# Patient Record
Sex: MALE | Race: WHITE | NOT HISPANIC OR LATINO
[De-identification: names, ages, dates, MRNs, and addresses within clinical notes are randomized per-mention and may not be internally consistent; named-entity substitution may affect disease eponyms.]

---

## 2017-01-12 ENCOUNTER — NON-APPOINTMENT (OUTPATIENT)
Age: 57
End: 2017-01-12

## 2017-01-12 ENCOUNTER — APPOINTMENT (OUTPATIENT)
Dept: CARDIOLOGY | Facility: CLINIC | Age: 57
End: 2017-01-12

## 2017-01-12 VITALS
SYSTOLIC BLOOD PRESSURE: 118 MMHG | DIASTOLIC BLOOD PRESSURE: 70 MMHG | TEMPERATURE: 97.4 F | HEART RATE: 86 BPM | WEIGHT: 144 LBS | HEIGHT: 72 IN | OXYGEN SATURATION: 98 % | BODY MASS INDEX: 19.5 KG/M2

## 2017-01-12 DIAGNOSIS — R00.2 PALPITATIONS: ICD-10-CM

## 2017-01-12 DIAGNOSIS — R94.31 ABNORMAL ELECTROCARDIOGRAM [ECG] [EKG]: ICD-10-CM

## 2017-01-12 DIAGNOSIS — R19.00 INTRA-ABDOMINAL AND PELVIC SWELLING, MASS AND LUMP, UNSPECIFIED SITE: ICD-10-CM

## 2017-01-12 DIAGNOSIS — R63.4 ABNORMAL WEIGHT LOSS: ICD-10-CM

## 2017-01-12 RX ORDER — FLUTICASONE PROPIONATE 50 UG/1
50 SPRAY, METERED NASAL
Qty: 16 | Refills: 0 | Status: COMPLETED | COMMUNITY
Start: 2017-01-10

## 2017-01-12 RX ORDER — LATANOPROST/PF 0.005 %
0.01 DROPS OPHTHALMIC (EYE)
Qty: 2 | Refills: 0 | Status: COMPLETED | COMMUNITY
Start: 2016-12-10

## 2017-01-12 RX ORDER — FLUOXETINE HYDROCHLORIDE 20 MG/5ML
20 SOLUTION ORAL
Qty: 90 | Refills: 0 | Status: COMPLETED | COMMUNITY
Start: 2016-07-17

## 2017-01-12 RX ORDER — ALENDRONATE SODIUM 70 MG/75ML
70 SOLUTION ORAL
Qty: 300 | Refills: 0 | Status: COMPLETED | COMMUNITY
Start: 2016-09-27

## 2017-01-12 RX ORDER — TERBINAFINE HYDROCHLORIDE 250 MG/1
250 TABLET ORAL
Qty: 30 | Refills: 0 | Status: COMPLETED | COMMUNITY
Start: 2017-01-05

## 2017-01-17 ENCOUNTER — APPOINTMENT (OUTPATIENT)
Dept: ULTRASOUND IMAGING | Facility: HOSPITAL | Age: 57
End: 2017-01-17

## 2017-01-17 ENCOUNTER — OUTPATIENT (OUTPATIENT)
Dept: OUTPATIENT SERVICES | Facility: HOSPITAL | Age: 57
LOS: 1 days | End: 2017-01-17
Payer: COMMERCIAL

## 2017-01-17 PROCEDURE — 76700 US EXAM ABDOM COMPLETE: CPT | Mod: 26

## 2017-01-17 PROCEDURE — 76700 US EXAM ABDOM COMPLETE: CPT

## 2017-01-27 ENCOUNTER — APPOINTMENT (OUTPATIENT)
Dept: HEMATOLOGY ONCOLOGY | Facility: CLINIC | Age: 57
End: 2017-01-27

## 2017-01-27 VITALS
DIASTOLIC BLOOD PRESSURE: 66 MMHG | TEMPERATURE: 98.3 F | BODY MASS INDEX: 20.32 KG/M2 | HEIGHT: 72 IN | WEIGHT: 150 LBS | HEART RATE: 80 BPM | SYSTOLIC BLOOD PRESSURE: 110 MMHG

## 2017-01-27 DIAGNOSIS — Z98.890 OTHER SPECIFIED POSTPROCEDURAL STATES: ICD-10-CM

## 2017-01-27 DIAGNOSIS — R13.10 DYSPHAGIA, UNSPECIFIED: ICD-10-CM

## 2017-01-27 RX ORDER — LATANOPROST/PF 0.005 %
0.01 DROPS OPHTHALMIC (EYE)
Refills: 0 | Status: ACTIVE | COMMUNITY

## 2017-01-30 ENCOUNTER — LABORATORY RESULT (OUTPATIENT)
Age: 57
End: 2017-01-30

## 2017-01-31 LAB
ALBUMIN SERPL ELPH-MCNC: 4.8 G/DL
ALP BLD-CCNC: 73 U/L
ALT SERPL-CCNC: 34 U/L
ANION GAP SERPL CALC-SCNC: 14 MMOL/L
AST SERPL-CCNC: 25 U/L
BASOPHILS # BLD AUTO: 0.03 K/UL
BASOPHILS NFR BLD AUTO: 0.4 %
BILIRUB SERPL-MCNC: 0.4 MG/DL
BUN SERPL-MCNC: 20 MG/DL
CALCIUM SERPL-MCNC: 9.9 MG/DL
CHLORIDE SERPL-SCNC: 99 MMOL/L
CO2 SERPL-SCNC: 28 MMOL/L
CREAT SERPL-MCNC: 1.02 MG/DL
DEPRECATED KAPPA LC FREE/LAMBDA SER: 1.28 RATIO
DEPRECATED KAPPA LC FREE/LAMBDA SER: 1.28 RATIO
EOSINOPHIL # BLD AUTO: 0.05 K/UL
EOSINOPHIL NFR BLD AUTO: 0.6 %
FERRITIN SERPL-MCNC: 87.4 NG/ML
FOLATE SERPL-MCNC: >20 NG/ML
GLUCOSE SERPL-MCNC: 94 MG/DL
HCT VFR BLD CALC: 42.2 %
HCYS SERPL-MCNC: 7.8 UMOL/L
HGB BLD-MCNC: 14.2 G/DL
IGA SER QL IEP: 189 MG/DL
IGG SER QL IEP: 1060 MG/DL
IGM SER QL IEP: 113 MG/DL
IMM GRANULOCYTES NFR BLD AUTO: 0.1 %
IRON SATN MFR SERPL: 18 %
IRON SERPL-MCNC: 58 UG/DL
KAPPA LC CSF-MCNC: 1.38 MG/DL
KAPPA LC CSF-MCNC: 1.38 MG/DL
KAPPA LC SERPL-MCNC: 1.76 MG/DL
KAPPA LC SERPL-MCNC: 1.76 MG/DL
LDH SERPL-CCNC: 150 U/L
LYMPHOCYTES # BLD AUTO: 3.19 K/UL
LYMPHOCYTES NFR BLD AUTO: 38.9 %
MAN DIFF?: NORMAL
MCHC RBC-ENTMCNC: 31.3 PG
MCHC RBC-ENTMCNC: 33.6 GM/DL
MCV RBC AUTO: 93 FL
MONOCYTES # BLD AUTO: 0.67 K/UL
MONOCYTES NFR BLD AUTO: 8.2 %
NEUTROPHILS # BLD AUTO: 4.24 K/UL
NEUTROPHILS NFR BLD AUTO: 51.8 %
PLATELET # BLD AUTO: 165 K/UL
POTASSIUM SERPL-SCNC: 4.6 MMOL/L
PROT SERPL-MCNC: 7.3 G/DL
RBC # BLD: 4.54 M/UL
RBC # FLD: 13.3 %
SODIUM SERPL-SCNC: 141 MMOL/L
TIBC SERPL-MCNC: 328 UG/DL
UIBC SERPL-MCNC: 270 UG/DL
VIT B12 SERPL-MCNC: 948 PG/ML
WBC # FLD AUTO: 8.19 K/UL

## 2017-02-01 LAB
ALBUMIN MFR SERPL ELPH: 65.8 %
ALBUMIN SERPL-MCNC: 4.8 G/DL
ALBUMIN/GLOB SERPL: 1.9 RATIO
ALPHA1 GLOB MFR SERPL ELPH: 3.2 %
ALPHA1 GLOB SERPL ELPH-MCNC: 0.2 G/DL
ALPHA2 GLOB MFR SERPL ELPH: 7.3 %
ALPHA2 GLOB SERPL ELPH-MCNC: 0.5 G/DL
B-GLOBULIN MFR SERPL ELPH: 10.2 %
B-GLOBULIN SERPL ELPH-MCNC: 0.7 G/DL
GAMMA GLOB FLD ELPH-MCNC: 1 G/DL
GAMMA GLOB MFR SERPL ELPH: 13.5 %
INTERPRETATION SERPL IEP-IMP: NORMAL
M PROTEIN SPEC IFE-MCNC: NORMAL
PROT SERPL-MCNC: 7.3 G/DL
PROT SERPL-MCNC: 7.3 G/DL

## 2017-02-02 LAB — METHYLMALONATE SERPL-SCNC: 0.26 NMOL/ML

## 2017-02-06 LAB — KAPPA LC 24H UR QL: NORMAL

## 2017-02-10 ENCOUNTER — APPOINTMENT (OUTPATIENT)
Dept: HEMATOLOGY ONCOLOGY | Facility: CLINIC | Age: 57
End: 2017-02-10

## 2017-02-10 VITALS
BODY MASS INDEX: 19.8 KG/M2 | SYSTOLIC BLOOD PRESSURE: 110 MMHG | WEIGHT: 146 LBS | HEART RATE: 80 BPM | DIASTOLIC BLOOD PRESSURE: 60 MMHG

## 2017-08-15 LAB
ALBUMIN MFR SERPL ELPH: 65.4 %
ALBUMIN SERPL-MCNC: 4.6 G/DL
ALBUMIN/GLOB SERPL: 1.8 RATIO
ALPHA1 GLOB MFR SERPL ELPH: 3.4 %
ALPHA1 GLOB SERPL ELPH-MCNC: 0.2 G/DL
ALPHA2 GLOB MFR SERPL ELPH: 7.8 %
ALPHA2 GLOB SERPL ELPH-MCNC: 0.6 G/DL
B-GLOBULIN MFR SERPL ELPH: 10.5 %
B-GLOBULIN SERPL ELPH-MCNC: 0.7 G/DL
FOLATE SERPL-MCNC: 19.7 NG/ML
GAMMA GLOB FLD ELPH-MCNC: 0.9 G/DL
GAMMA GLOB MFR SERPL ELPH: 12.9 %
INTERPRETATION SERPL IEP-IMP: NORMAL
LDH SERPL-CCNC: 252 U/L
PROT SERPL-MCNC: 7.1 G/DL
PROT SERPL-MCNC: 7.1 G/DL
VIT B12 SERPL-MCNC: 1131 PG/ML

## 2017-08-21 ENCOUNTER — APPOINTMENT (OUTPATIENT)
Dept: HEMATOLOGY ONCOLOGY | Facility: CLINIC | Age: 57
End: 2017-08-21
Payer: COMMERCIAL

## 2017-08-21 VITALS — SYSTOLIC BLOOD PRESSURE: 124 MMHG | BODY MASS INDEX: 20.75 KG/M2 | DIASTOLIC BLOOD PRESSURE: 70 MMHG | WEIGHT: 153 LBS

## 2017-08-21 DIAGNOSIS — R91.8 OTHER NONSPECIFIC ABNORMAL FINDING OF LUNG FIELD: ICD-10-CM

## 2017-08-21 DIAGNOSIS — Z98.890 OTHER SPECIFIED POSTPROCEDURAL STATES: ICD-10-CM

## 2017-08-21 DIAGNOSIS — R74.8 ABNORMAL LEVELS OF OTHER SERUM ENZYMES: ICD-10-CM

## 2017-08-21 PROCEDURE — 99214 OFFICE O/P EST MOD 30 MIN: CPT

## 2017-08-27 ENCOUNTER — FORM ENCOUNTER (OUTPATIENT)
Age: 57
End: 2017-08-27

## 2017-08-28 ENCOUNTER — APPOINTMENT (OUTPATIENT)
Dept: CT IMAGING | Facility: HOSPITAL | Age: 57
End: 2017-08-28
Payer: COMMERCIAL

## 2017-08-28 ENCOUNTER — OUTPATIENT (OUTPATIENT)
Dept: OUTPATIENT SERVICES | Facility: HOSPITAL | Age: 57
LOS: 1 days | End: 2017-08-28
Payer: COMMERCIAL

## 2017-08-28 PROCEDURE — 71250 CT THORAX DX C-: CPT | Mod: 26

## 2017-08-28 PROCEDURE — 71250 CT THORAX DX C-: CPT

## 2017-08-29 ENCOUNTER — MESSAGE (OUTPATIENT)
Age: 57
End: 2017-08-29

## 2017-09-22 LAB — LDH SERPL-CCNC: 161 U/L

## 2018-09-25 ENCOUNTER — OUTPATIENT (OUTPATIENT)
Dept: OUTPATIENT SERVICES | Facility: HOSPITAL | Age: 58
LOS: 1 days | End: 2018-09-25
Payer: COMMERCIAL

## 2018-09-25 ENCOUNTER — APPOINTMENT (OUTPATIENT)
Dept: RADIOLOGY | Facility: HOSPITAL | Age: 58
End: 2018-09-25
Payer: COMMERCIAL

## 2018-09-25 DIAGNOSIS — M54.6 PAIN IN THORACIC SPINE: ICD-10-CM

## 2018-09-25 DIAGNOSIS — G89.29 OTHER CHRONIC PAIN: ICD-10-CM

## 2018-09-25 PROCEDURE — 72070 X-RAY EXAM THORAC SPINE 2VWS: CPT

## 2018-09-25 PROCEDURE — 72100 X-RAY EXAM L-S SPINE 2/3 VWS: CPT

## 2018-09-25 PROCEDURE — 72070 X-RAY EXAM THORAC SPINE 2VWS: CPT | Mod: 26

## 2018-09-25 PROCEDURE — 72100 X-RAY EXAM L-S SPINE 2/3 VWS: CPT | Mod: 26

## 2019-02-06 ENCOUNTER — OUTPATIENT (OUTPATIENT)
Dept: OUTPATIENT SERVICES | Facility: HOSPITAL | Age: 59
LOS: 1 days | End: 2019-02-06
Payer: COMMERCIAL

## 2019-02-06 ENCOUNTER — APPOINTMENT (OUTPATIENT)
Dept: ULTRASOUND IMAGING | Facility: CLINIC | Age: 59
End: 2019-02-06
Payer: COMMERCIAL

## 2019-02-06 DIAGNOSIS — Z00.8 ENCOUNTER FOR OTHER GENERAL EXAMINATION: ICD-10-CM

## 2019-02-06 PROCEDURE — 76700 US EXAM ABDOM COMPLETE: CPT | Mod: 26

## 2019-02-06 PROCEDURE — 76700 US EXAM ABDOM COMPLETE: CPT

## 2019-03-07 ENCOUNTER — APPOINTMENT (OUTPATIENT)
Dept: TRAUMA SURGERY | Facility: CLINIC | Age: 59
End: 2019-03-07
Payer: COMMERCIAL

## 2019-03-07 VITALS
WEIGHT: 160 LBS | SYSTOLIC BLOOD PRESSURE: 137 MMHG | DIASTOLIC BLOOD PRESSURE: 78 MMHG | TEMPERATURE: 98.4 F | HEIGHT: 71 IN | BODY MASS INDEX: 22.4 KG/M2 | HEART RATE: 73 BPM

## 2019-03-07 DIAGNOSIS — K82.4 CHOLESTEROLOSIS OF GALLBLADDER: ICD-10-CM

## 2019-03-07 PROCEDURE — 99243 OFF/OP CNSLTJ NEW/EST LOW 30: CPT | Mod: 57

## 2019-03-07 NOTE — PLAN
[FreeTextEntry1] : I recommended cholecystectomy for gallbladder polyps.\par The risks (bleeding, infection, bile duct injury), benefits (symptom relief) and alternatives of laparoscopic (possible open) cholecystectomy. He desires surgery and will be booked electively. He was offered surgery at [facility] the last week in March and will call my office to confirm.\par

## 2019-03-07 NOTE — PHYSICAL EXAM
[Oriented to Person] : oriented to person [Oriented to Place] : oriented to place [Oriented to Time] : oriented to time [Calm] : calm [de-identified] : appears well [de-identified] : no respiratory distress [de-identified] : soft / NT / ND.\par no surgical scars on abdomen.\par  [de-identified] : no jaundice

## 2019-03-07 NOTE — HISTORY OF PRESENT ILLNESS
[de-identified] : EGD (6/7/2016 by Dr Joel Tavares) - relative normal\par RUQ U/S (1/17/2017) - gallbladder polyps (4 mm, 6 mm), no CBD dilatation\par RUQ U/S (2/6/2019) - gallbladder polyps (largest is 9 x 6 x 7 mm), CBD 5 mm\par LFTs (2/15/2019) - wnl [de-identified] : c/o intermittent RUQ pain radiating to right scapula for several months.\par worse after eating. also worse at night when laying on his right side in bed. associated with belching at night.\par no nausea or vomiting.\par no fevers or chills.\par no tea-colored urine or freida-colored stool to suggest choledocholithiasis.\par \par able to climb 2 flights of stairs without dyspnea or chest pain.\par

## 2019-03-07 NOTE — REVIEW OF SYSTEMS
[As Noted in HPI] : as noted in HPI [Fever] : no fever [Chills] : no chills [Chest Pain] : no chest pain [Shortness Of Breath] : no shortness of breath

## 2019-03-07 NOTE — CONSULT LETTER
[Dear  ___] : Dear  [unfilled], [Consult Letter:] : I had the pleasure of evaluating your patient, [unfilled]. [Please see my note below.] : Please see my note below. [Referral Closing:] : Thank you very much for seeing this patient.  If you have any questions, please do not hesitate to contact me. [DrJosy  ___] : Dr. HILL [FreeTextEntry3] : Amarjit Ludwig MD, FACS\par [phone]

## 2020-03-04 ENCOUNTER — APPOINTMENT (OUTPATIENT)
Dept: ORTHOPEDIC SURGERY | Facility: CLINIC | Age: 60
End: 2020-03-04

## 2020-05-19 ENCOUNTER — APPOINTMENT (OUTPATIENT)
Dept: SURGERY | Facility: CLINIC | Age: 60
End: 2020-05-19
Payer: COMMERCIAL

## 2020-05-19 VITALS
HEIGHT: 71 IN | SYSTOLIC BLOOD PRESSURE: 128 MMHG | DIASTOLIC BLOOD PRESSURE: 83 MMHG | OXYGEN SATURATION: 99 % | TEMPERATURE: 98 F | BODY MASS INDEX: 21.7 KG/M2 | HEART RATE: 76 BPM | WEIGHT: 155 LBS | RESPIRATION RATE: 16 BRPM

## 2020-05-19 DIAGNOSIS — K64.4 RESIDUAL HEMORRHOIDAL SKIN TAGS: ICD-10-CM

## 2020-05-19 DIAGNOSIS — Z87.19 PERSONAL HISTORY OF OTHER DISEASES OF THE DIGESTIVE SYSTEM: ICD-10-CM

## 2020-05-19 PROCEDURE — 46230 REMOVAL OF ANAL TAGS: CPT

## 2020-05-19 PROCEDURE — 99243 OFF/OP CNSLTJ NEW/EST LOW 30: CPT | Mod: 25

## 2020-05-19 RX ORDER — SERTRALINE 25 MG/1
25 TABLET, FILM COATED ORAL
Refills: 0 | Status: DISCONTINUED | COMMUNITY
End: 2020-05-19

## 2020-05-19 NOTE — HISTORY OF PRESENT ILLNESS
[FreeTextEntry1] : Robert is a 59 yr old male here for a consultation. Colonoscopy from 2017 by  demonstrated Sigmoid: 4 mm sessile rectosigmoid polyp and internal hemorrhoid. \par Patient reports increased "harder" BMs this past weekend, developed sharp rectal pain and swelling. Pt having BRBPR for the past week. Pain worsened by BMs. Taking stool softeners, stool is now soft-normal formed. Pain level today: 7/10. Has had intermittent swelling, bleeding and pain in the past

## 2020-05-19 NOTE — ASSESSMENT
[FreeTextEntry1] : I have seen and evaluated patient and I have corroborated all nursing input into this note.  Patient with acute internal hemorrhoid prolapse and thrombosis in the right anterior and right posterior positions.  This is associated with external hemorrhoid edema.  Since the patient has had intermittent bleeding swelling and pain over the years I offered to perform external hemorrhoid excision in conjunction with internal hemorrhoid rubber band ligation to treat both the acute and chronic problems.  Indications, risks, benefits, alternatives reviewed including but not limited to bleeding, infection, failure, fissure, tags, and recurrence.  All questions were answered and the patient agreed.  1% lidocaine with half percent Marcaine plus epinephrine was injected as local anesthetic.  Both the right anterior and right posterior external hemorrhoids excised.  Internal hemorrhoids reduced and multiple rubber band ligations performed.  Monsel solution applied.  Postop instructions reviewed.  Patient will call office immediately if increasing pain, fevers, difficulty urinating, or persistent bleeding.  Sitz bath's, Tylenol alternating with ibuprofen, oxycodone for breakthrough pain, follow-up 3 to 4 weeks

## 2020-05-19 NOTE — PHYSICAL EXAM
[Normal Breath Sounds] : Normal breath sounds [No Edema] : No edema [Normal Heart Sounds] : normal heart sounds [Alert] : alert [No Rash or Lesion] : No rash or lesion [Oriented to Place] : oriented to place [Oriented to Person] : oriented to person [Oriented to Time] : oriented to time [Anxious] : anxious [Abdomen Masses] : No abdominal masses [Abdomen Tenderness] : ~T No ~M abdominal tenderness [JVD] : no jugular venous distention  [de-identified] : Appears well nourished [de-identified] : WNL [de-identified] : Full ROM [FreeTextEntry1] : Perianal inspection revealed right anterior and right posterior external hemorrhoid edema with associated internal hemorrhoid prolapse and thrombosis.

## 2020-05-19 NOTE — CONSULT LETTER
[Dear  ___] : Dear ~IVETT, [Please see my note below.] : Please see my note below. [Consult Letter:] : I had the pleasure of evaluating your patient, [unfilled]. [Sincerely,] : Sincerely, [Consult Closing:] : Thank you very much for allowing me to participate in the care of this patient.  If you have any questions, please do not hesitate to contact me. [FreeTextEntry2] : Dr. Navdeep Groves [DrJosy  ___] : Dr. HILL [FreeTextEntry3] : Trell Sanchez M.D., FRANK.NISREEN., F.ATUL.[email or web address].[email or web address].\[facility] Chief Colorectal Clinical Services, [facility]

## 2020-05-21 LAB — CORE LAB BIOPSY: NORMAL

## 2020-06-23 ENCOUNTER — APPOINTMENT (OUTPATIENT)
Dept: SURGERY | Facility: CLINIC | Age: 60
End: 2020-06-23
Payer: COMMERCIAL

## 2020-06-23 VITALS
SYSTOLIC BLOOD PRESSURE: 121 MMHG | RESPIRATION RATE: 18 BRPM | TEMPERATURE: 99 F | HEART RATE: 86 BPM | DIASTOLIC BLOOD PRESSURE: 76 MMHG | OXYGEN SATURATION: 97 %

## 2020-06-23 DIAGNOSIS — K62.5 HEMORRHAGE OF ANUS AND RECTUM: ICD-10-CM

## 2020-06-23 PROCEDURE — 46600 DIAGNOSTIC ANOSCOPY SPX: CPT

## 2020-06-23 NOTE — HISTORY OF PRESENT ILLNESS
[FreeTextEntry1] : Robert Burgos is a 59 year  old male here for a follow up vist.  Colonoscopy from 2017 by  demonstrated Sigmoid: 4 mm sessile rectosigmoid polyp and internal hemorrhoid. last seen on 05/19/20 with acute edematous external hemorrhoids with associated edematous internal hemorrhoid prolapse.  Both the right anterior and right posterior external hemorrhoids excised. Internal hemorrhoids reduced and multiple rubber band ligations performed.\par Today pt reports prolapsing tissue. Reports discomfort and BRPR on tp. Denies constipation. Takes stool softener twice  a day. \par \par

## 2020-06-23 NOTE — PHYSICAL EXAM
[FreeTextEntry1] : Perianal inspection digital exam and anoscopy demonstrated a dramatic reduction of hemorrhoidal tissue and ongoing healing of the right posterior external wound.

## 2020-06-23 NOTE — ASSESSMENT
[FreeTextEntry1] : Patient wound healing normally.  Spotting of blood is from incomplete healing of the external wound which is expected at this time.  Follow-up 6 weeks.  Complete healing should occur by that time.

## 2020-08-05 ENCOUNTER — APPOINTMENT (OUTPATIENT)
Dept: SURGERY | Facility: CLINIC | Age: 60
End: 2020-08-05
Payer: COMMERCIAL

## 2020-08-05 VITALS
HEART RATE: 62 BPM | RESPIRATION RATE: 16 BRPM | TEMPERATURE: 98 F | SYSTOLIC BLOOD PRESSURE: 114 MMHG | OXYGEN SATURATION: 99 % | DIASTOLIC BLOOD PRESSURE: 73 MMHG

## 2020-08-05 DIAGNOSIS — K64.3 FOURTH DEGREE HEMORRHOIDS: ICD-10-CM

## 2020-08-05 DIAGNOSIS — H40.003 PREGLAUCOMA, UNSPECIFIED, BILATERAL: ICD-10-CM

## 2020-08-05 PROCEDURE — 99213 OFFICE O/P EST LOW 20 MIN: CPT | Mod: 25

## 2020-08-05 PROCEDURE — 46600 DIAGNOSTIC ANOSCOPY SPX: CPT

## 2020-08-05 RX ORDER — OXYCODONE 5 MG/1
5 TABLET ORAL
Qty: 10 | Refills: 0 | Status: DISCONTINUED | COMMUNITY
Start: 2020-05-19 | End: 2020-08-05

## 2020-08-05 NOTE — ASSESSMENT
[FreeTextEntry1] : Patient much improved.  Dramatic reduction of hemorrhoidal tissue.  Follow-up PRN significant recurrent symptoms.

## 2020-08-05 NOTE — PHYSICAL EXAM
[FreeTextEntry1] : Perianal inspection digital exam and anoscopy unremarkable.  All wounds healed.  Significant reduction of hemorrhoidal tissue.

## 2020-08-05 NOTE — HISTORY OF PRESENT ILLNESS
[FreeTextEntry1] : Robert is a 60 y/o male here for follow up visit. Colonoscopy from 2017 by Dr. Groves demonstrated Sigmoid: 4 mm sessile rectosigmoid polyp and internal hemorrhoid. Patient was seen on 5/19/20 with acute edematous external hemorrhoids with associated edematous internal hemorrhoid prolapse. Both the right anterior and right posterior external hemorrhoids excised. Internal hemorrhoids reduced and multiple rubber band ligations performed. Last seen on 6/23/20, patient with normal wound healing. Spotting of blood is from incomplete healing of the external wound which. \par Today he is feeling well, just here for a follow up visit. Reports 4 bms with normal consistency - had some blood when wiped over the weekend.

## 2021-05-17 ENCOUNTER — APPOINTMENT (OUTPATIENT)
Dept: MRI IMAGING | Facility: HOSPITAL | Age: 61
End: 2021-05-17

## 2022-08-25 ENCOUNTER — NON-APPOINTMENT (OUTPATIENT)
Age: 62
End: 2022-08-25

## 2022-08-30 ENCOUNTER — NON-APPOINTMENT (OUTPATIENT)
Age: 62
End: 2022-08-30

## 2022-08-30 ENCOUNTER — APPOINTMENT (OUTPATIENT)
Dept: CARDIOLOGY | Facility: CLINIC | Age: 62
End: 2022-08-30

## 2022-08-30 VITALS
WEIGHT: 155 LBS | DIASTOLIC BLOOD PRESSURE: 74 MMHG | OXYGEN SATURATION: 95 % | BODY MASS INDEX: 21.7 KG/M2 | HEIGHT: 71 IN | HEART RATE: 87 BPM | SYSTOLIC BLOOD PRESSURE: 143 MMHG

## 2022-08-30 VITALS — SYSTOLIC BLOOD PRESSURE: 140 MMHG | HEART RATE: 82 BPM | OXYGEN SATURATION: 98 % | DIASTOLIC BLOOD PRESSURE: 70 MMHG

## 2022-08-30 DIAGNOSIS — R06.02 SHORTNESS OF BREATH: ICD-10-CM

## 2022-08-30 DIAGNOSIS — R55 SYNCOPE AND COLLAPSE: ICD-10-CM

## 2022-08-30 DIAGNOSIS — R73.09 OTHER ABNORMAL GLUCOSE: ICD-10-CM

## 2022-08-30 DIAGNOSIS — R06.09 OTHER FORMS OF DYSPNEA: ICD-10-CM

## 2022-08-30 DIAGNOSIS — R91.8 OTHER NONSPECIFIC ABNORMAL FINDING OF LUNG FIELD: ICD-10-CM

## 2022-08-30 PROCEDURE — 99244 OFF/OP CNSLTJ NEW/EST MOD 40: CPT

## 2022-08-30 PROCEDURE — 93000 ELECTROCARDIOGRAM COMPLETE: CPT

## 2022-08-30 NOTE — HISTORY OF PRESENT ILLNESS
[FreeTextEntry1] : August 25, 2022.  Received records from [place] dated April 13, 2022.  EKG read as sinus bradycardia and possible anterior infarct.  Echo that showed normal LV size and function, normal RV size and function, and just minimal MR.  EKG also interpreted as inferior infarct age undetermined on April 15.\par August 29, 2022.  Patient has appointment tomorrow as a new patient.  Received records from a cardiology consult done in the hospital.  It seems the patient had a pneumothorax after biopsy.  There was a rapid response called because of the short period of asystole.  The cardiology consult did not find any abnormalities echocardiogram was done which was within normal limits and he was just scheduled for outpatient follow-up.  \par August 30, 2022.  Patient now here as a new patient.  Sees Dr. Fuller for primary care. Received records from [place] dated April 13, 2022.  EKG read as sinus bradycardia and possible anterior infarct.  Echo that showed normal LV size and function, normal RV size and function, and just minimal MR.  EKG also interpreted as inferior infarct age undetermined on April 15.\par

## 2022-08-30 NOTE — PHYSICAL EXAM
[General Appearance - Well Developed] : well developed [Normal Appearance] : normal appearance [Well Groomed] : well groomed [General Appearance - Well Nourished] : well nourished [No Deformities] : no deformities [General Appearance - In No Acute Distress] : no acute distress [Normal Conjunctiva] : the conjunctiva exhibited no abnormalities [Eyelids - No Xanthelasma] : the eyelids demonstrated no xanthelasmas [Normal Oral Mucosa] : normal oral mucosa [No Oral Pallor] : no oral pallor [No Oral Cyanosis] : no oral cyanosis [Normal Jugular Venous A Waves Present] : normal jugular venous A waves present [Normal Jugular Venous V Waves Present] : normal jugular venous V waves present [No Jugular Venous Crawford A Waves] : no jugular venous crawford A waves [Respiration, Rhythm And Depth] : normal respiratory rhythm and effort [Exaggerated Use Of Accessory Muscles For Inspiration] : no accessory muscle use [Auscultation Breath Sounds / Voice Sounds] : lungs were clear to auscultation bilaterally [Heart Rate And Rhythm] : heart rate and rhythm were normal [Heart Sounds] : normal S1 and S2 [Murmurs] : no murmurs present [Abdomen Soft] : soft [Abdomen Tenderness] : non-tender [Abdomen Mass (___ Cm)] : no abdominal mass palpated [Abnormal Walk] : normal gait [Gait - Sufficient For Exercise Testing] : the gait was sufficient for exercise testing [Nail Clubbing] : no clubbing of the fingernails [Cyanosis, Localized] : no localized cyanosis [Petechial Hemorrhages (___cm)] : no petechial hemorrhages [Skin Color & Pigmentation] : normal skin color and pigmentation [] : no rash [No Venous Stasis] : no venous stasis [Skin Lesions] : no skin lesions [No Skin Ulcers] : no skin ulcer [No Xanthoma] : no  xanthoma was observed [Oriented To Time, Place, And Person] : oriented to person, place, and time [Affect] : the affect was normal [Mood] : the mood was normal [FreeTextEntry1] : No edema. Pulses 2+ bilaterally symmetrical including pedal

## 2022-08-30 NOTE — REASON FOR VISIT
[FreeTextEntry1] : 61-year-old man (son of a former patient of mine) here after recent hospitalization and an episode of bradycardia.

## 2022-08-30 NOTE — HISTORY OF PRESENT ILLNESS
[FreeTextEntry1] : August 25, 2022.  Received records from [place] dated April 13, 2022.  EKG read as sinus bradycardia and possible anterior infarct.  Echo that showed normal LV size and function, normal RV size and function, and just minimal MR.  EKG also interpreted as inferior infarct age undetermined on April 15.\par August 29, 2022.  Patient has appointment tomorrow as a new patient.  Received records from a cardiology consult done in the hospital.  It seems the patient had a pneumothorax after biopsy.  There was a rapid response called because of the short period of asystole.  The cardiology consult did not find any abnormalities echocardiogram was done which was within normal limits and he was just scheduled for outpatient follow-up.  \par August 30, 2022.  Patient now here as a new patient.  Sees Dr. Fuller for primary care. Received records from [place] dated April 13, 2022.  EKG read as sinus bradycardia and possible anterior infarct.  Echo that showed normal LV size and function, normal RV size and function, and just minimal MR.  EKG also interpreted as inferior infarct age undetermined on April 15.\par

## 2022-08-30 NOTE — CARDIOLOGY SUMMARY
[___] : [unfilled] [No Ischemia] : no Ischemia [No Exercise Ind Arr] : no exercise induced arrhythmias [No Symptoms] : no Symptoms [de-identified] : April 13, 2022.  Done elsewhere.  Normal mitral valve with minimal MR.  Normal trileaflet aortic valve with no AI.  Normal left atrium.  Normal LV size and systolic function with LVEF 62%.  Normal diastolic function.  Normal RV size and function with minimal TR.  Dilated IVC.  Normal right atrium.  Normal pericardium with no effusion.

## 2022-08-30 NOTE — REVIEW OF SYSTEMS
[Dyspnea on exertion] : dyspnea during exertion [Dizziness] : dizziness [Negative] : Heme/Lymph [Fever] : no fever [Weight Gain (___ Lbs)] : no recent weight gain [Chills] : no chills [Weight Loss (___ Lbs)] : no recent weight loss [Chest Discomfort] : no chest discomfort [Lower Ext Edema] : no extremity edema [Palpitations] : no palpitations [Orthopnea] : no orthopnea [PND] : no PND [Syncope] : no syncope [Cough] : no cough [Coughing Up Blood] : no hemoptysis [FreeTextEntry5] : see HPI [FreeTextEntry6] : see HPI.  Lung mass [FreeTextEntry7] : History of GI issues back in the past [de-identified] : Occasionally dizzy if he gets up too quickly [de-identified] : Had a work-up for high B12 level and other issues in the past that was negative

## 2022-08-30 NOTE — REVIEW OF SYSTEMS
[Dyspnea on exertion] : dyspnea during exertion [Dizziness] : dizziness [Negative] : Heme/Lymph [Fever] : no fever [Weight Gain (___ Lbs)] : no recent weight gain [Chills] : no chills [Weight Loss (___ Lbs)] : no recent weight loss [Chest Discomfort] : no chest discomfort [Lower Ext Edema] : no extremity edema [Palpitations] : no palpitations [Orthopnea] : no orthopnea [PND] : no PND [Syncope] : no syncope [Cough] : no cough [Coughing Up Blood] : no hemoptysis [FreeTextEntry5] : see HPI [FreeTextEntry6] : see HPI.  Lung mass [FreeTextEntry7] : History of GI issues back in the past [de-identified] : Occasionally dizzy if he gets up too quickly [de-identified] : Had a work-up for high B12 level and other issues in the past that was negative

## 2022-08-30 NOTE — CARDIOLOGY SUMMARY
[___] : [unfilled] [No Ischemia] : no Ischemia [No Exercise Ind Arr] : no exercise induced arrhythmias [No Symptoms] : no Symptoms [de-identified] : April 13, 2022.  Done elsewhere.  Normal mitral valve with minimal MR.  Normal trileaflet aortic valve with no AI.  Normal left atrium.  Normal LV size and systolic function with LVEF 62%.  Normal diastolic function.  Normal RV size and function with minimal TR.  Dilated IVC.  Normal right atrium.  Normal pericardium with no effusion.

## 2023-07-05 ENCOUNTER — APPOINTMENT (OUTPATIENT)
Dept: RADIOLOGY | Facility: HOSPITAL | Age: 63
End: 2023-07-05

## 2023-07-05 ENCOUNTER — OUTPATIENT (OUTPATIENT)
Dept: OUTPATIENT SERVICES | Facility: HOSPITAL | Age: 63
LOS: 1 days | End: 2023-07-05
Payer: COMMERCIAL

## 2023-07-05 DIAGNOSIS — M79.672 PAIN IN LEFT FOOT: ICD-10-CM

## 2023-07-05 DIAGNOSIS — Z00.00 ENCOUNTER FOR GENERAL ADULT MEDICAL EXAMINATION WITHOUT ABNORMAL FINDINGS: ICD-10-CM

## 2023-07-05 PROCEDURE — 73630 X-RAY EXAM OF FOOT: CPT

## 2023-07-05 PROCEDURE — 73630 X-RAY EXAM OF FOOT: CPT | Mod: 26,LT

## 2024-05-08 ASSESSMENT — HOOS JR: HOOS JR RAW SCORE: 0

## 2024-11-07 ENCOUNTER — TRANSCRIPTION ENCOUNTER (OUTPATIENT)
Age: 64
End: 2024-11-07

## 2024-11-07 ENCOUNTER — APPOINTMENT (OUTPATIENT)
Dept: CARDIOLOGY | Facility: CLINIC | Age: 64
End: 2024-11-07
Payer: COMMERCIAL

## 2024-11-07 ENCOUNTER — NON-APPOINTMENT (OUTPATIENT)
Age: 64
End: 2024-11-07

## 2024-11-07 VITALS
WEIGHT: 158 LBS | DIASTOLIC BLOOD PRESSURE: 62 MMHG | SYSTOLIC BLOOD PRESSURE: 120 MMHG | BODY MASS INDEX: 22.04 KG/M2 | OXYGEN SATURATION: 98 % | HEART RATE: 77 BPM

## 2024-11-07 DIAGNOSIS — R07.9 CHEST PAIN, UNSPECIFIED: ICD-10-CM

## 2024-11-07 DIAGNOSIS — R06.02 SHORTNESS OF BREATH: ICD-10-CM

## 2024-11-07 DIAGNOSIS — R91.8 OTHER NONSPECIFIC ABNORMAL FINDING OF LUNG FIELD: ICD-10-CM

## 2024-11-07 PROCEDURE — 93306 TTE W/DOPPLER COMPLETE: CPT

## 2024-11-07 PROCEDURE — 93000 ELECTROCARDIOGRAM COMPLETE: CPT

## 2024-11-07 PROCEDURE — 99215 OFFICE O/P EST HI 40 MIN: CPT

## 2024-11-07 PROCEDURE — G2211 COMPLEX E/M VISIT ADD ON: CPT | Mod: NC

## 2024-11-14 ENCOUNTER — NON-APPOINTMENT (OUTPATIENT)
Age: 64
End: 2024-11-14

## 2024-11-15 ENCOUNTER — NON-APPOINTMENT (OUTPATIENT)
Age: 64
End: 2024-11-15

## 2024-11-15 ENCOUNTER — APPOINTMENT (OUTPATIENT)
Dept: CARDIOLOGY | Facility: CLINIC | Age: 64
End: 2024-11-15
Payer: COMMERCIAL

## 2024-11-15 ENCOUNTER — APPOINTMENT (OUTPATIENT)
Dept: CARDIOLOGY | Facility: CLINIC | Age: 64
End: 2024-11-15

## 2024-11-15 DIAGNOSIS — R06.09 OTHER FORMS OF DYSPNEA: ICD-10-CM

## 2024-11-15 DIAGNOSIS — R94.31 ABNORMAL ELECTROCARDIOGRAM [ECG] [EKG]: ICD-10-CM

## 2024-11-15 PROCEDURE — 93306 TTE W/DOPPLER COMPLETE: CPT
